# Patient Record
Sex: MALE | Race: OTHER | HISPANIC OR LATINO | URBAN - METROPOLITAN AREA
[De-identification: names, ages, dates, MRNs, and addresses within clinical notes are randomized per-mention and may not be internally consistent; named-entity substitution may affect disease eponyms.]

---

## 2024-02-17 ENCOUNTER — EMERGENCY (EMERGENCY)
Facility: HOSPITAL | Age: 54
LOS: 1 days | Discharge: HOME | End: 2024-02-17
Attending: STUDENT IN AN ORGANIZED HEALTH CARE EDUCATION/TRAINING PROGRAM | Admitting: STUDENT IN AN ORGANIZED HEALTH CARE EDUCATION/TRAINING PROGRAM
Payer: COMMERCIAL

## 2024-02-17 VITALS
SYSTOLIC BLOOD PRESSURE: 126 MMHG | OXYGEN SATURATION: 98 % | TEMPERATURE: 98 F | RESPIRATION RATE: 18 BRPM | HEART RATE: 60 BPM | DIASTOLIC BLOOD PRESSURE: 71 MMHG

## 2024-02-17 VITALS
SYSTOLIC BLOOD PRESSURE: 126 MMHG | WEIGHT: 177.91 LBS | HEIGHT: 72 IN | OXYGEN SATURATION: 99 % | HEART RATE: 65 BPM | DIASTOLIC BLOOD PRESSURE: 78 MMHG | TEMPERATURE: 98 F | RESPIRATION RATE: 18 BRPM

## 2024-02-17 DIAGNOSIS — Z91.018 ALLERGY TO OTHER FOODS: ICD-10-CM

## 2024-02-17 DIAGNOSIS — H92.03 OTALGIA, BILATERAL: ICD-10-CM

## 2024-02-17 DIAGNOSIS — J34.89 OTHER SPECIFIED DISORDERS OF NOSE AND NASAL SINUSES: ICD-10-CM

## 2024-02-17 DIAGNOSIS — Z98.890 OTHER SPECIFIED POSTPROCEDURAL STATES: ICD-10-CM

## 2024-02-17 PROCEDURE — 70486 CT MAXILLOFACIAL W/O DYE: CPT | Mod: MA

## 2024-02-17 PROCEDURE — 99284 EMERGENCY DEPT VISIT MOD MDM: CPT

## 2024-02-17 PROCEDURE — 70486 CT MAXILLOFACIAL W/O DYE: CPT | Mod: 26,MA

## 2024-02-17 PROCEDURE — 99284 EMERGENCY DEPT VISIT MOD MDM: CPT | Mod: 25

## 2024-02-17 NOTE — ED PROVIDER NOTE - CLINICAL SUMMARY MEDICAL DECISION MAKING FREE TEXT BOX
53-year-old male with history of recurrent sinusitis status post sinus resection years ago presenting with sinus pressure and ear pain, acute on chronic in nature over past 1 month. Overall well appearing, afebrile. Exam with some bulging of b/l TMs and diffuse sinus tenderness but no evidence of acute bacterial infection component. Suspect chronic/inflammatory/viral etiology but given recent wisdom tooth removal and degree of discomfort will obtain CT MF non con imaging to assess for associated dental infection vs air fluid levels. Anticipate dc with close outpatient ENT follow up--would trial PO clinda course since no improvement on Augmentin/prednisone.

## 2024-02-17 NOTE — ED ADULT NURSE NOTE - NSFALLUNIVINTERV_ED_ALL_ED
Bed/Stretcher in lowest position, wheels locked, appropriate side rails in place/Call bell, personal items and telephone in reach/Instruct patient to call for assistance before getting out of bed/chair/stretcher/Non-slip footwear applied when patient is off stretcher/[place] to call system/Physically safe environment - no spills, clutter or unnecessary equipment/Purposeful proactive rounding/Room/bathroom lighting operational, light cord in reach

## 2024-02-17 NOTE — ED ADULT NURSE NOTE - OBJECTIVE STATEMENT
Pt presents to ED c/o sinus pressure x 1 month a. "I had a wisdom tooth removed 1 month ago and I have been having a lot of pain and pressure all over my face since. I just finished a course of antibiotics and steroids and its not getting better". Pt reports left ear pain, nasal drip as well as sinus congestion X 4 months

## 2024-02-17 NOTE — ED PROVIDER NOTE - NSFOLLOWUPINSTRUCTIONS_ED_ALL_ED_FT
You were seen in the Emergency Department for: sinus/ear pressure    You were prescribed to the pharmacy:  Please follow the instructions on the container/label and ask your pharmacist for any questions/concerns.    For pain, you may take Tylenol (acetaminophen) 975 mg every 6 hours, AND/OR Advil (ibuprofen) 600 mg every 8 hours.    Please follow up with your primary physician. If you do not have a primary physician or specialist of your needs, please call [phone] to find one convenient for you. At this number you will be able to locate a provider who accepts your insurance, as well as locate the right specialist for your needs.    You should return to the Emergency Department if you feel any new/worsening/persistent symptoms including but not limited to: fever, chills, vomiting, chest pain, difficulty breathing, loss of consciousness, bleeding, uncontrolled pain, numbness/weakness of a body part You were seen in the Emergency Department for: sinus/ear pressure    You were prescribed to the pharmacy: clindamycin  Please follow the instructions on the container/label and ask your pharmacist for any questions/concerns.    For pain, you may take Tylenol (acetaminophen) 975 mg every 6 hours, AND/OR Advil (ibuprofen) 600 mg every 8 hours.    Please follow up with your primary physician and ENT specialist. If you do not have a primary physician or specialist of your needs, please call [phone] to find one convenient for you. At this number you will be able to locate a provider who accepts your insurance, as well as locate the right specialist for your needs.    You should return to the Emergency Department if you feel any new/worsening/persistent symptoms including but not limited to: fever, chills, vomiting, chest pain, difficulty breathing, loss of consciousness, bleeding, uncontrolled pain, numbness/weakness of a body part

## 2024-02-17 NOTE — ED PROVIDER NOTE - PATIENT PORTAL LINK FT
You can access the FollowMyHealth Patient Portal offered by [facility] by registering at the following website: [email or web address]. By joining [facility]’s FollowMyHealth portal, you will also be able to view your health information using other applications (apps) compatible with our system.

## 2024-02-17 NOTE — ED ADULT TRIAGE NOTE - CHIEF COMPLAINT QUOTE
Pt presents to ED c/o sinus pressure x 1 month. "I had a wisdom tooth removed 1 month ago and I have been having a lot of pain and pressure all over my face since. I just finished a course of antibiotics and steroids and its not getting better".

## 2024-02-17 NOTE — ED PROVIDER NOTE - OBJECTIVE STATEMENT
53-year-old male with history of recurrent sinusitis status post sinus resection years ago presenting with sinus pressure and ear pain.  States that for the past 1 month after having wisdom tooth removed he has had persistent sinus pressure that travels around his face associated with bilateral ear discomfort, was seen by a new ENT specialist and prescribed a course of Augmentin and prednisone without noticeable improvement.  Denies fevers, chills, drainage.  Feels that Flonase and sinus irrigation does not help.  Reports that taking Augmentin used to greatly improved symptoms but feels that it is no longer working.

## 2024-02-17 NOTE — ED PROVIDER NOTE - PROGRESS NOTE DETAILS
Cesar Johansen MD: Patient reassessed, resting comfortably. Discussed CT results. Will dc with clindamycin course and plan for f/u with his ENT specialist.

## 2024-02-17 NOTE — ED PROVIDER NOTE - PHYSICAL EXAMINATION
Gen - No acute distress, appears comfortable  HEENT - NCAT, EOMI, TMs clear but mildly bulging b/l, clear oropharynx, normal dentition  Resp - even chest rise, no tachypnea/increased WOB  CV -  RRR  Abd - soft, nondistended  MSK - no gross deformities  Extrem - no LE edema/erythema  Neuro - no focal motor, sensation, or CN deficits  Skin - warm, well perfused

## 2024-02-17 NOTE — ED PROVIDER NOTE - CCCP TRG CHIEF CMPLNT
atient Name: Kody Nesbitt  Date:2022  : 1945  [x]  Patient  Verified  Payor: Elizabeth Ill / Plan: [place] / Product Type: Medicare /    Total Treatment Time (min): 45   1:1 Treatment Time ( [address] only): 45   Ghulam Siddiqui MD  1. Lumbar back pain with radiculopathy affecting right lower extremity  2. Difficulty walking      Subjective:    Patient is a 78 y.o. male referred to physical therapy by Dr. Santy Brown of [facility] with a diagnosis of lower back and gluteal pain. He reports his lower back history is significant for an L2-S1 revision fusion in . He reports his current onset of symptoms has been ongoing for approximately 1 to 2 months without specific mechanism for onset. He rates pain approximately 9+/10. He locates pain around the right PSIS, right piriformis, and right QL. He states pain limits his ability to perform upright standing posture. He has been walking with a flexed trunk. He states this limits his walking endurance to approximately 5 minutes or less. Sleep is interrupted as well. He enjoys being active and exercising recreationally and states that his current symptoms are limiting his activity level. He has had a previous right total knee replacement in 2017 for which we did treat him with good result. He is taking an Aspirin daily as he has a cardiac stent. Past medical history and medication list can otherwise be reviewed per the EHR. Objective:    Patient ambulates in the clinic with a flexed trunk posture. He is tender to palpation over the right QL, right piriformis, and right sacral border. Seated lower extremity strength testing is a 5/5 at all levels. Slump testing is negative. Straight leg raise testing is negative. He does have flexibility restrictions throughout his bilateral lower extremities. Incision appears to be intact and healing well without current signs of infection.   Hip scour testing is negative. He is able to demonstrate single limb stance with appropriate balance deficits. Oswestry low back pain disability questionnaire score 34%. Ex: 10 min  Treatment today to include instruction in a home exercise program as well as providing patient with written and visual handouts. Man: 15 min  Superficial and deep myofascial and trigger point release techniques performed to the piriformis and QL in side-lying. Similar techniques performed to the iliopsoas in supine. NMR:        All questions were addressed. Assessment:    Patient presents with increased pain and decreased ROM, strength, and mobility consistent with back and posterior right hip pain with a history of revision L2-S1 fusion in 2020. Decreasing activity has led to decreasing flexibility which does contribute to his symptoms. Reports feeling better at end of session today as compared to arrival.  Demonstrates a more upright gait at end of session today as compared to arrival.    Long Term Goals. 6 weeks  1. Patient will report centralized LBP to be consisitently less than or equal to 1/10 with all home/community/recreational ADL activity. 2. Patient will report zero radicular pain with home/community/recreational ADL activity. Short Term Goals. 2 visits. 1. Patient will demonstrate independence with HEP. Plan:  Plan of care: Physical therapy consisted of frequency of 1-2/week for the next 6 weeks. Physical therapy will consist of therapeutic exercise, modalities, patient education, neuromuscular reeducation, manual therapy, therapeutic activity, dry needling, and instruction in home exercise program as appropriate. Eval  Ex: 10  Man: 15  NMR:       The referring physician has reviewed and approved this evaluation and plan of care as noted by the electronic signature attached to note.     Terrie Verdin DPT, ATC sinus congestion/pain

## 2024-02-17 NOTE — ED ADULT NURSE NOTE - NS ED NURSE RECORD ANOTHER HT AND WT
Left VM to call office. OK per Dr. Apodaca to take him as a new patient. The telephone note is in his mother (fatuma cerda's) chart. OK to schedule for 30 min establish care/ PE.    Yes

## 2025-07-15 NOTE — ED ADULT NURSE NOTE - TEMPLATE LIST FOR HEAD TO TOE ASSESSMENT
[facility],   [facility] [facility], [place]  [phone]  Phone: (   )    -  Fax: (   )    -  Follow Up Time: